# Patient Record
Sex: FEMALE | Race: WHITE | NOT HISPANIC OR LATINO | Employment: FULL TIME | URBAN - METROPOLITAN AREA
[De-identification: names, ages, dates, MRNs, and addresses within clinical notes are randomized per-mention and may not be internally consistent; named-entity substitution may affect disease eponyms.]

---

## 2023-09-20 ENCOUNTER — OUTSIDE PLACE OF SERVICE (OUTPATIENT)
Dept: PEDIATRIC CARDIOLOGY | Facility: CLINIC | Age: 28
End: 2023-09-20
Payer: MEDICAID

## 2023-09-20 PROCEDURE — 76827 ECHO EXAM OF FETAL HEART: CPT | Mod: 26,,, | Performed by: PEDIATRICS

## 2023-09-20 PROCEDURE — 99204 PR OFFICE/OUTPT VISIT, NEW, LEVL IV, 45-59 MIN: ICD-10-PCS | Mod: 25,,, | Performed by: PEDIATRICS

## 2023-09-20 PROCEDURE — 76825 PR  SO2 FETAL HEART: ICD-10-PCS | Mod: 26,,, | Performed by: PEDIATRICS

## 2023-09-20 PROCEDURE — 99204 OFFICE O/P NEW MOD 45 MIN: CPT | Mod: 25,,, | Performed by: PEDIATRICS

## 2023-09-20 PROCEDURE — 76827 PR  SO2 FETAL HEART DOPPLER: ICD-10-PCS | Mod: 26,,, | Performed by: PEDIATRICS

## 2023-09-20 PROCEDURE — 76825 ECHO EXAM OF FETAL HEART: CPT | Mod: 26,,, | Performed by: PEDIATRICS

## 2023-09-20 NOTE — PROGRESS NOTES
[facility]    OB: Dr. Brianne Enriquez    MFM: Dr. Hannah NIELSEN  Today, I had the pleasure of evaluating Payton Claudio who is now 27 y.o. and carrying her first pregnancy at 21w6d gestation with an HALEY of 2024. She was referred for evaluation of the fetal heart due to a thickened nuchal fold noted on a recent ultrasound. Prior testing includes a low-risk free fetal DNA screening.    She is carrying a female fetus, yet unnamed.      Obstetric History:    .  Her OB history is otherwise unremarkable.     Past Medical History:   Diagnosis Date    Hidradenitis suppurativa     Psoriasis          Current Outpatient Medications:     acetaminophen (TYLENOL) 325 MG tablet, Take 325 mg by mouth every 6 (six) hours as needed for Pain (Headaches)., Disp: , Rfl:     prenatal 25/iron fum/folic/dha (PRENATAL-1 ORAL), Take 1 tablet by mouth once daily., Disp: , Rfl:     Family History: Patient's father has a history of aortic valve abnormality requiring surgery at 50 years of age and subsequently  1 day later following surgery. The patient's paternal uncle also had the same valve abnormality as her father requiring surgery, currently living      FETAL ECHOCARDIOGRAM (summary):  S,D,S  Grossly structurally normal fetal heart with normally related great arteries.   There is a normal fetal foramen ovale with right to left flow.   Normal atrioventricular valve size with no significant atrioventricular valve insufficiency.   Good cardiac contractility.   Normal semilunar valve size and function.   The ductus arteriosus was visualized with right to left flow.   Normal branch pulmonary artery size.   The aortic arch was difficult to visualize.  It appears normal in size on limited evaluation.   Normal systemic venous drainage.   One pulmonary vein noted to drain to the left atrium.   No pericardial effusion.   Fetal heart rate 153 bpm      Impression:  Single active female fetus at 21w6d gestation.  Normal fetal echocardiogram.      Todays fetal echocardiogram is normal, within the limitations of fetal echocardiography.  I discussed with her that fetal echocardiography is insufficiently sensitive to rule out all septal defects, anomalies of pulmonary and systemic veins, arch anomalies, and some valvar abnormalities, nor can it ensure that the ductus arteriosus and foramen ovale will spontaneously close.     Recommendations:  Location, timing, and mode of delivery will be determined by the obstetrical team.  She does not require further follow-up in the fetal echocardiography clinic, but I would be happy to see her again if additional questions or concerns arise.    Should there be any concerns about the baby's heart after birth, a post- echocardiogram and cardiology consultation are recommended.     The above information was discussed in detail including the use of diagrams, with 45 minutes of total face to face time, with greater than 50% with counseling and coordination of care.  The discussion of the diagnosis and treatment options is as described above.      Rebeca Adhikari MD  Pediatric Cardiology  [address] [facility]  [place] [address]  Office: [phone]

## 2024-12-03 ENCOUNTER — HOSPITAL ENCOUNTER (EMERGENCY)
Facility: HOSPITAL | Age: 29
Discharge: HOME | End: 2024-12-03
Attending: EMERGENCY MEDICINE
Payer: COMMERCIAL

## 2024-12-03 VITALS
SYSTOLIC BLOOD PRESSURE: 153 MMHG | WEIGHT: 279.75 LBS | HEIGHT: 69 IN | HEART RATE: 93 BPM | BODY MASS INDEX: 41.43 KG/M2 | RESPIRATION RATE: 20 BRPM | TEMPERATURE: 98 F | OXYGEN SATURATION: 98 % | DIASTOLIC BLOOD PRESSURE: 98 MMHG

## 2024-12-03 DIAGNOSIS — N83.201 CYST OF RIGHT OVARY: Primary | ICD-10-CM

## 2024-12-03 LAB
ALBUMIN SERPL BCP-MCNC: 3.8 G/DL (ref 3.5–5.2)
ALP SERPL-CCNC: 102 U/L (ref 40–150)
ALT SERPL W/O P-5'-P-CCNC: 55 U/L (ref 10–44)
ANION GAP SERPL CALC-SCNC: 7 MMOL/L (ref 8–16)
AST SERPL-CCNC: 41 U/L (ref 10–40)
B-HCG UR QL: NEGATIVE
BASOPHILS # BLD AUTO: 0.05 K/UL (ref 0–0.2)
BASOPHILS NFR BLD: 0.8 % (ref 0–1.9)
BILIRUB SERPL-MCNC: 0.4 MG/DL (ref 0.1–1)
BILIRUB UR QL STRIP: NEGATIVE
BUN SERPL-MCNC: 14 MG/DL (ref 6–20)
CALCIUM SERPL-MCNC: 9 MG/DL (ref 8.7–10.5)
CHLORIDE SERPL-SCNC: 104 MMOL/L (ref 95–110)
CLARITY UR: CLEAR
CO2 SERPL-SCNC: 26 MMOL/L (ref 23–29)
COLOR UR: YELLOW
CREAT SERPL-MCNC: 0.8 MG/DL (ref 0.5–1.4)
DIFFERENTIAL METHOD BLD: ABNORMAL
EOSINOPHIL # BLD AUTO: 0.1 K/UL (ref 0–0.5)
EOSINOPHIL NFR BLD: 2 % (ref 0–8)
ERYTHROCYTE [DISTWIDTH] IN BLOOD BY AUTOMATED COUNT: 12.4 % (ref 11.5–14.5)
EST. GFR  (NO RACE VARIABLE): >60 ML/MIN/1.73 M^2
GLUCOSE SERPL-MCNC: 101 MG/DL (ref 70–110)
GLUCOSE UR QL STRIP: NEGATIVE
HCT VFR BLD AUTO: 40.5 % (ref 37–48.5)
HCV AB SERPL QL IA: NEGATIVE
HEP C VIRUS HOLD SPECIMEN: NORMAL
HGB BLD-MCNC: 13.9 G/DL (ref 12–16)
HGB UR QL STRIP: NEGATIVE
HIV 1+2 AB+HIV1 P24 AG SERPL QL IA: NEGATIVE
IMM GRANULOCYTES # BLD AUTO: 0.05 K/UL (ref 0–0.04)
IMM GRANULOCYTES NFR BLD AUTO: 0.8 % (ref 0–0.5)
KETONES UR QL STRIP: NEGATIVE
LEUKOCYTE ESTERASE UR QL STRIP: ABNORMAL
LIPASE SERPL-CCNC: 14 U/L (ref 4–60)
LYMPHOCYTES # BLD AUTO: 1.6 K/UL (ref 1–4.8)
LYMPHOCYTES NFR BLD: 25.8 % (ref 18–48)
MCH RBC QN AUTO: 29 PG (ref 27–31)
MCHC RBC AUTO-ENTMCNC: 34.3 G/DL (ref 32–36)
MCV RBC AUTO: 85 FL (ref 82–98)
MICROSCOPIC COMMENT: ABNORMAL
MONOCYTES # BLD AUTO: 0.3 K/UL (ref 0.3–1)
MONOCYTES NFR BLD: 4.6 % (ref 4–15)
NEUTROPHILS # BLD AUTO: 4.2 K/UL (ref 1.8–7.7)
NEUTROPHILS NFR BLD: 66 % (ref 38–73)
NITRITE UR QL STRIP: NEGATIVE
NRBC BLD-RTO: 0 /100 WBC
PH UR STRIP: 6 [PH] (ref 5–8)
PLATELET # BLD AUTO: 232 K/UL (ref 150–450)
PMV BLD AUTO: 10.3 FL (ref 9.2–12.9)
POTASSIUM SERPL-SCNC: 3.8 MMOL/L (ref 3.5–5.1)
PROT SERPL-MCNC: 7.7 G/DL (ref 6–8.4)
PROT UR QL STRIP: ABNORMAL
RBC # BLD AUTO: 4.79 M/UL (ref 4–5.4)
RBC #/AREA URNS HPF: 4 /HPF (ref 0–4)
SODIUM SERPL-SCNC: 137 MMOL/L (ref 136–145)
SP GR UR STRIP: 1.02 (ref 1–1.03)
SQUAMOUS #/AREA URNS HPF: 4 /HPF
UNIDENT CRYS URNS QL MICRO: ABNORMAL
URN SPEC COLLECT METH UR: ABNORMAL
UROBILINOGEN UR STRIP-ACNC: NEGATIVE EU/DL
WBC # BLD AUTO: 6.35 K/UL (ref 3.9–12.7)
WBC #/AREA URNS HPF: 26 /HPF (ref 0–5)

## 2024-12-03 PROCEDURE — 86803 HEPATITIS C AB TEST: CPT | Performed by: EMERGENCY MEDICINE

## 2024-12-03 PROCEDURE — 83690 ASSAY OF LIPASE: CPT | Performed by: EMERGENCY MEDICINE

## 2024-12-03 PROCEDURE — 99284 EMERGENCY DEPT VISIT MOD MDM: CPT | Mod: 25

## 2024-12-03 PROCEDURE — 87389 HIV-1 AG W/HIV-1&-2 AB AG IA: CPT | Performed by: EMERGENCY MEDICINE

## 2024-12-03 PROCEDURE — 87086 URINE CULTURE/COLONY COUNT: CPT | Performed by: EMERGENCY MEDICINE

## 2024-12-03 PROCEDURE — 81025 URINE PREGNANCY TEST: CPT | Performed by: EMERGENCY MEDICINE

## 2024-12-03 PROCEDURE — 81000 URINALYSIS NONAUTO W/SCOPE: CPT | Performed by: EMERGENCY MEDICINE

## 2024-12-03 PROCEDURE — 87147 CULTURE TYPE IMMUNOLOGIC: CPT | Performed by: EMERGENCY MEDICINE

## 2024-12-03 PROCEDURE — 85025 COMPLETE CBC W/AUTO DIFF WBC: CPT | Performed by: EMERGENCY MEDICINE

## 2024-12-03 PROCEDURE — 87088 URINE BACTERIA CULTURE: CPT | Performed by: EMERGENCY MEDICINE

## 2024-12-03 PROCEDURE — 80053 COMPREHEN METABOLIC PANEL: CPT | Performed by: EMERGENCY MEDICINE

## 2024-12-03 RX ORDER — LISINOPRIL 10 MG/1
10 TABLET ORAL DAILY
COMMUNITY

## 2024-12-03 RX ORDER — NAPROXEN 375 MG/1
375 TABLET ORAL 2 TIMES DAILY WITH MEALS
Qty: 30 TABLET | Refills: 0 | Status: SHIPPED | OUTPATIENT
Start: 2024-12-03

## 2024-12-03 NOTE — ED PROVIDER NOTES
SCRIBE #1 NOTE: I, Bharat Waddell, am scribing for, and in the presence of, Quang Rutldege MD. I have scribed the entire note.       History     Chief Complaint   Patient presents with    Abdominal Pain     Pt reports yesterday morning began having bilateral lower quadrant pain and diarrhea. This morning pt reports right lower quadrant pain, especially tender upon palpation. -N/-V/-D today     Review of patient's allergies indicates:   Allergen Reactions    Sulfa (sulfonamide antibiotics) Hives         History of Present Illness     HPI    12/3/2024, 6:12 AM  History obtained from the patient      History of Present Illness: Payton Claudio is a 29 y.o. female patient with a PMHx of hidradenitis suppurativa and psoriasis who presents to the Emergency Department for evaluation of bilateral lower quadrant pain which onset gradually since yesterday morning. Pt states the pain became just the RLQ today under palpation. Symptoms are constant and moderate in severity. No mitigating or exacerbating factors reported. No associated sxs reported. Patient denies any vaginal discharge, fever, nausea, vomiting, diarrhea, and all other sxs at this time. No prior Tx reported. Pt states her last menstrual cycle ended on November 19th. No further complaints or concerns at this time.       Arrival mode: Personal vehicle     PCP: No, Primary Doctor        Past Medical History:  Past Medical History:   Diagnosis Date    Hidradenitis suppurativa     Psoriasis        Past Surgical History:  Past Surgical History:   Procedure Laterality Date    TONSILLECTOMY Bilateral     WISDOM TOOTH EXTRACTION Bilateral          Family History:  No family history on file.    Social History:  Social History     Tobacco Use    Smoking status: Never    Smokeless tobacco: Never   Substance and Sexual Activity    Alcohol use: Not Currently    Drug use: Never    Sexual activity: Yes     Partners: Male        Review of Systems     Review of Systems    Constitutional:  Negative for chills and fever.   HENT:  Negative for sore throat.    Respiratory:  Negative for shortness of breath.    Cardiovascular:  Negative for chest pain.   Gastrointestinal:  Positive for abdominal pain. Negative for diarrhea, nausea and vomiting.   Genitourinary:  Negative for dysuria and vaginal discharge.   Musculoskeletal:  Negative for back pain.   Skin:  Negative for rash.   Neurological:  Negative for weakness.   Hematological:  Does not bruise/bleed easily.   All other systems reviewed and are negative.     Physical Exam     Initial Vitals [12/03/24 0600]   BP Pulse Resp Temp SpO2   (!) 164/111 110 18 98 °F (36.7 °C) 99 %      MAP       --          Physical Exam  Nursing Notes and Vital Signs Reviewed.  Constitutional: Patient is in no acute distress. Well-developed and well-nourished.  Head: Atraumatic. Normocephalic.  Eyes: PERRL. EOM intact. Conjunctivae are not pale. No scleral icterus.  ENT: Mucous membranes are moist. Oropharynx is clear and symmetric.    Neck: Supple. Full ROM. No lymphadenopathy.  Cardiovascular: Regular rate. Regular rhythm. No murmurs, rubs, or gallops. Distal pulses are 2+ and symmetric.  Pulmonary/Chest: No respiratory distress. Clear to auscultation bilaterally. No wheezing or rales.  Abdominal: Soft and non-distended.  There is no tenderness.  No rebound, guarding, or rigidity. Good bowel sounds.  Genitourinary: No CVA tenderness  Musculoskeletal: Moves all extremities. No obvious deformities. No edema. No calf tenderness.  Skin: Warm and dry.  Neurological:  Alert, awake, and appropriate.  Normal speech.  No acute focal neurological deficits are appreciated.  Psychiatric: Normal affect. Good eye contact. Appropriate in content.     ED Course   Procedures  ED Vital Signs:  Vitals:    12/03/24 0600 12/03/24 0630 12/03/24 0730 12/03/24 0830   BP: (!) 164/111 (!) 177/114 (!) 156/87 (!) 149/97   Pulse: 110 98 93 88   Resp: 18 18 20 20   Temp: 98 °F (36.7  "°C)      TempSrc: Oral      SpO2: 99% 99% 98% 99%   Weight: 126.9 kg (279 lb 12.2 oz)      Height: 5' 9" (1.753 m)       12/03/24 0900   BP: (!) 156/113   Pulse: 89   Resp: 20   Temp:    TempSrc:    SpO2: 100%   Weight:    Height:        Abnormal Lab Results:  Labs Reviewed   URINALYSIS, REFLEX TO URINE CULTURE - Abnormal       Result Value    Specimen UA Urine, Clean Catch      Color, UA Yellow      Appearance, UA Clear      pH, UA 6.0      Specific Gravity, UA 1.020      Protein, UA Trace (*)     Glucose, UA Negative      Ketones, UA Negative      Bilirubin (UA) Negative      Occult Blood UA Negative      Nitrite, UA Negative      Urobilinogen, UA Negative      Leukocytes, UA 3+ (*)     Narrative:     Specimen Source->Urine   CBC W/ AUTO DIFFERENTIAL - Abnormal    WBC 6.35      RBC 4.79      Hemoglobin 13.9      Hematocrit 40.5      MCV 85      MCH 29.0      MCHC 34.3      RDW 12.4      Platelets 232      MPV 10.3      Immature Granulocytes 0.8 (*)     Gran # (ANC) 4.2      Immature Grans (Abs) 0.05 (*)     Lymph # 1.6      Mono # 0.3      Eos # 0.1      Baso # 0.05      nRBC 0      Gran % 66.0      Lymph % 25.8      Mono % 4.6      Eosinophil % 2.0      Basophil % 0.8      Differential Method Automated      Narrative:     Release to patient->Immediate   COMPREHENSIVE METABOLIC PANEL - Abnormal    Sodium 137      Potassium 3.8      Chloride 104      CO2 26      Glucose 101      BUN 14      Creatinine 0.8      Calcium 9.0      Total Protein 7.7      Albumin 3.8      Total Bilirubin 0.4      Alkaline Phosphatase 102      AST 41 (*)     ALT 55 (*)     eGFR >60      Anion Gap 7 (*)     Narrative:     Release to patient->Immediate   URINALYSIS MICROSCOPIC - Abnormal    RBC, UA 4      WBC, UA 26 (*)     Squam Epithel, UA 4      Unclass Jenna UA Rare      Microscopic Comment SEE COMMENT      Narrative:     Specimen Source->Urine   CULTURE, URINE   HEPATITIS C ANTIBODY    Hepatitis C Ab Negative      Narrative:     Release " to patient->Immediate   HEP C VIRUS HOLD SPECIMEN    HEP C Virus Hold Specimen Hold for HCV sendout      Narrative:     Release to patient->Immediate   HIV 1 / 2 ANTIBODY    HIV 1/2 Ag/Ab Negative      Narrative:     Release to patient->Immediate   PREGNANCY TEST, URINE RAPID    Preg Test, Ur Negative     LIPASE    Lipase 14      Narrative:     Release to patient->Immediate        All Lab Results:  Results for orders placed or performed during the hospital encounter of 12/03/24   Urinalysis, Reflex to Urine Culture Urine, Clean Catch    Collection Time: 12/03/24  6:13 AM    Specimen: Urine   Result Value Ref Range    Specimen UA Urine, Clean Catch     Color, UA Yellow Yellow, Straw, Radha    Appearance, UA Clear Clear    pH, UA 6.0 5.0 - 8.0    Specific Gravity, UA 1.020 1.005 - 1.030    Protein, UA Trace (A) Negative    Glucose, UA Negative Negative    Ketones, UA Negative Negative    Bilirubin (UA) Negative Negative    Occult Blood UA Negative Negative    Nitrite, UA Negative Negative    Urobilinogen, UA Negative <2.0 EU/dL    Leukocytes, UA 3+ (A) Negative   Urinalysis Microscopic    Collection Time: 12/03/24  6:13 AM   Result Value Ref Range    RBC, UA 4 0 - 4 /hpf    WBC, UA 26 (H) 0 - 5 /hpf    Squam Epithel, UA 4 /hpf    Unclass Jenna UA Rare None-Moderate    Microscopic Comment SEE COMMENT    Hepatitis C Antibody    Collection Time: 12/03/24  6:22 AM   Result Value Ref Range    Hepatitis C Ab Negative Negative   HCV Virus Hold Specimen    Collection Time: 12/03/24  6:22 AM   Result Value Ref Range    HEP C Virus Hold Specimen Hold for HCV sendout    HIV 1/2 Ag/Ab (4th Gen)    Collection Time: 12/03/24  6:22 AM   Result Value Ref Range    HIV 1/2 Ag/Ab Negative Negative   CBC Auto Differential    Collection Time: 12/03/24  6:22 AM   Result Value Ref Range    WBC 6.35 3.90 - 12.70 K/uL    RBC 4.79 4.00 - 5.40 M/uL    Hemoglobin 13.9 12.0 - 16.0 g/dL    Hematocrit 40.5 37.0 - 48.5 %    MCV 85 82 - 98 fL    MCH  29.0 27.0 - 31.0 pg    MCHC 34.3 32.0 - 36.0 g/dL    RDW 12.4 11.5 - 14.5 %    Platelets 232 150 - 450 K/uL    MPV 10.3 9.2 - 12.9 fL    Immature Granulocytes 0.8 (H) 0.0 - 0.5 %    Gran # (ANC) 4.2 1.8 - 7.7 K/uL    Immature Grans (Abs) 0.05 (H) 0.00 - 0.04 K/uL    Lymph # 1.6 1.0 - 4.8 K/uL    Mono # 0.3 0.3 - 1.0 K/uL    Eos # 0.1 0.0 - 0.5 K/uL    Baso # 0.05 0.00 - 0.20 K/uL    nRBC 0 0 /100 WBC    Gran % 66.0 38.0 - 73.0 %    Lymph % 25.8 18.0 - 48.0 %    Mono % 4.6 4.0 - 15.0 %    Eosinophil % 2.0 0.0 - 8.0 %    Basophil % 0.8 0.0 - 1.9 %    Differential Method Automated    Comprehensive Metabolic Panel    Collection Time: 12/03/24  6:22 AM   Result Value Ref Range    Sodium 137 136 - 145 mmol/L    Potassium 3.8 3.5 - 5.1 mmol/L    Chloride 104 95 - 110 mmol/L    CO2 26 23 - 29 mmol/L    Glucose 101 70 - 110 mg/dL    BUN 14 6 - 20 mg/dL    Creatinine 0.8 0.5 - 1.4 mg/dL    Calcium 9.0 8.7 - 10.5 mg/dL    Total Protein 7.7 6.0 - 8.4 g/dL    Albumin 3.8 3.5 - 5.2 g/dL    Total Bilirubin 0.4 0.1 - 1.0 mg/dL    Alkaline Phosphatase 102 40 - 150 U/L    AST 41 (H) 10 - 40 U/L    ALT 55 (H) 10 - 44 U/L    eGFR >60 >60 mL/min/1.73 m^2    Anion Gap 7 (L) 8 - 16 mmol/L   Lipase    Collection Time: 12/03/24  6:22 AM   Result Value Ref Range    Lipase 14 4 - 60 U/L   Pregnancy, urine rapid    Collection Time: 12/03/24  6:24 AM   Result Value Ref Range    Preg Test, Ur Negative          Imaging Results:  Imaging Results              US Pelvis Comp with Transvag NON-OB (xpd) (Final result)  Result time 12/03/24 10:06:38   Procedure changed from US Pelvis Complete Non OB     Final result by Brenton Huertas MD (12/03/24 10:06:38)                   Impression:      Right ovarian hemorrhagic cyst suspected.    Details as above.      Electronically signed by: Brenton Huertas  Date:    12/03/2024  Time:    10:06               Narrative:    EXAMINATION:  US PELVIS COMP WITH TRANSVAG NON-OB (XPD)    CLINICAL HISTORY:  ovarian  cyst;    TECHNIQUE:  Transabdominal sonography of the pelvis was performed, followed by transvaginal sonography to better evaluate the uterus and ovaries.    COMPARISON:  None.    FINDINGS:  Uterus:    Size: 6.6 x 3.9 x 4.7 cm    Masses: None    Endometrium: Normal in this pre menopausal patient, measuring 6 mm.    Right ovary:    Size: 5.2 x 3.6 x 3.5 cm    Appearance: 3.4 cm suspected hemorrhagic cyst.  Additional smaller lesions suspected.  Recommend 12 week follow-up to ensure resolution.    Vascular flow: Normal.    Left ovary:    Size: 2.9 x 1.5 x 2.2 cm    Appearance: Normal    Vascular Flow: Normal.    Free Fluid:    None.                                       CT Renal Stone Study ABD Pelvis WO (Final result)  Result time 12/03/24 07:48:00      Final result by Venkat Sanchez MD (12/03/24 07:48:00)                   Impression:      1. No acute finding in the abdomen or pelvis.  2. Other findings as above.      Electronically signed by: Venkat Sanchez  Date:    12/03/2024  Time:    07:48               Narrative:    EXAMINATION:  CT RENAL STONE STUDY ABD PELVIS WO    CLINICAL HISTORY:  Flank pain, kidney stone suspected;    COMPARISON:  None available.    TECHNIQUE:  Axial CT images were obtained of the abdomen and pelvis.  Iterative reconstruction technique was used. The CT exam was performed using one or more of the following dose reduction techniques- Automated exposure control, adjustment of the mA and/or kV according to patient size, and/or use of iterative reconstructed technique.    FINDINGS:  Heart: Normal in size. No pericardial effusion.    Lung Bases: Mild bibasilar atelectasis/scarring.    Liver: Hepatic steatosis.    Gallbladder: No calcified gallstones.    Bile Ducts: No evidence of dilated ducts.    Pancreas: No mass or peripancreatic fat stranding.    Spleen: Unremarkable.    Adrenals: Unremarkable.    Kidneys/ Ureters: No renal calculi or hydronephrosis.    Bladder: No evidence of wall  thickening.    Reproductive organs: Simple appearing 4.9 cm right adnexal cyst.    GI Tract/Mesentery: No evidence of bowel obstruction or acute inflammation.  The appendix has a normal appearance.    Peritoneal Space: Unremarkable.    Retroperitoneum: No adenopathy.    Abdominal wall: Unremarkable.    Vasculature: No aortic aneurysm.    Bones: No acute fracture.                                                The Emergency Provider reviewed the vital signs and test results, which are outlined above.     ED Discussion     10:35 AM: Reassessed pt at this time. Discussed with pt all pertinent ED information and results. Discussed pt dx and plan of tx. Gave pt all f/u and return to the ED instructions. All questions and concerns were addressed at this time. Pt expresses understanding of information and instructions, and is comfortable with plan to discharge. Pt is stable for discharge.    I discussed with patient and/or family/caretaker that evaluation in the ED does not suggest any emergent or life threatening medical conditions requiring immediate intervention beyond what was provided in the ED, and I believe patient is safe for discharge.  Regardless, an unremarkable evaluation in the ED does not preclude the development or presence of a serious of life threatening condition. As such, patient was instructed to return immediately for any worsening or change in current symptoms.         Medical Decision Making  DDx: appendicitis, ovarian cyst    Amount and/or Complexity of Data Reviewed  Labs: ordered. Decision-making details documented in ED Course.  Radiology: ordered. Decision-making details documented in ED Course.     Details: Ovarian cyst, no torsion    Risk  Prescription drug management.                ED Medication(s):  Medications - No data to display    New Prescriptions    NAPROXEN (NAPROSYN) 375 MG TABLET    Take 1 tablet (375 mg total) by mouth 2 (two) times daily with meals.        Follow-up Information        [facility] In 2 days.    Contact information:  [address] [place] [address]  [phone]                                 Scribe Attestation:   Scribe #1: I performed the above scribed service and the documentation accurately describes the services I performed. I attest to the accuracy of the note.     Attending:   Physician Attestation Statement for Scribe #1: I, Quang Rutledge MD, personally performed the services described in this documentation, as scribed by Bharat Waddell, in my presence, and it is both accurate and complete.           Clinical Impression       ICD-10-CM ICD-9-CM   1. Cyst of right ovary  N83.201 620.2       Disposition:   Disposition: Discharged  Condition: Stable         Quang Rutledge MD  12/03/24 1108

## 2024-12-03 NOTE — Clinical Note
"Payton Donovana" González was seen and treated in our emergency department on 12/3/2024.  She may return to work on 12/04/2024.       If you have any questions or concerns, please don't hesitate to call.      RN RN    "

## 2024-12-05 LAB
BACTERIA UR CULT: ABNORMAL

## 2024-12-07 ENCOUNTER — TELEPHONE (OUTPATIENT)
Dept: EMERGENCY MEDICINE | Facility: HOSPITAL | Age: 29
End: 2024-12-07
Payer: COMMERCIAL

## 2025-02-07 ENCOUNTER — OFFICE VISIT (OUTPATIENT)
Dept: OBSTETRICS AND GYNECOLOGY | Facility: CLINIC | Age: 30
End: 2025-02-07
Payer: COMMERCIAL

## 2025-02-07 VITALS
HEIGHT: 69 IN | SYSTOLIC BLOOD PRESSURE: 146 MMHG | DIASTOLIC BLOOD PRESSURE: 84 MMHG | BODY MASS INDEX: 42.29 KG/M2 | WEIGHT: 285.5 LBS

## 2025-02-07 DIAGNOSIS — N83.201 HEMORRHAGIC CYST OF RIGHT OVARY: Primary | ICD-10-CM

## 2025-02-07 PROCEDURE — 99999 PR PBB SHADOW E&M-EST. PATIENT-LVL IV: CPT | Mod: PBBFAC,,,

## 2025-02-07 PROCEDURE — 4010F ACE/ARB THERAPY RXD/TAKEN: CPT | Mod: CPTII,S$GLB,,

## 2025-02-07 PROCEDURE — 3077F SYST BP >= 140 MM HG: CPT | Mod: CPTII,S$GLB,,

## 2025-02-07 PROCEDURE — 1160F RVW MEDS BY RX/DR IN RCRD: CPT | Mod: CPTII,S$GLB,,

## 2025-02-07 PROCEDURE — 3008F BODY MASS INDEX DOCD: CPT | Mod: CPTII,S$GLB,,

## 2025-02-07 PROCEDURE — 3079F DIAST BP 80-89 MM HG: CPT | Mod: CPTII,S$GLB,,

## 2025-02-07 PROCEDURE — 99202 OFFICE O/P NEW SF 15 MIN: CPT | Mod: S$GLB,,,

## 2025-02-07 PROCEDURE — 1159F MED LIST DOCD IN RCRD: CPT | Mod: CPTII,S$GLB,,

## 2025-02-07 RX ORDER — CLOBETASOL PROPIONATE 0.5 MG/ML
SOLUTION TOPICAL 2 TIMES DAILY
COMMUNITY
Start: 2024-11-25

## 2025-02-07 RX ORDER — ADALIMUMAB 40MG/0.4ML
40 KIT SUBCUTANEOUS
COMMUNITY
Start: 2025-01-23

## 2025-02-07 RX ORDER — LOSARTAN POTASSIUM 25 MG/1
25 TABLET ORAL EVERY MORNING
COMMUNITY
Start: 2025-01-07

## 2025-02-07 RX ORDER — VALACYCLOVIR HYDROCHLORIDE 1 G/1
2000 TABLET, FILM COATED ORAL
COMMUNITY
Start: 2024-09-23

## 2025-02-07 NOTE — PROGRESS NOTES
Subjective:       Patient ID: Payton Claudio is a 29 y.o. female.    Chief Complaint:  Follow-up (Cyst to R ovary)      History of Present Illness  Follow-up  Associated symptoms include headaches. Pertinent negatives include no abdominal pain, anorexia, chills, fever, nausea, rash or vomiting.   Gynecologic Exam  The patient's pertinent negatives include no genital itching, genital lesions, genital odor, genital rash, missed menses, pelvic pain, vaginal bleeding or vaginal discharge. This is a recurrent problem. The current episode started more than 1 month ago. The problem occurs intermittently. The problem has been unchanged. The pain is mild. The problem affects the right side. She is not pregnant. Associated symptoms include headaches and painful intercourse. Pertinent negatives include no abdominal pain, anorexia, back pain, chills, constipation, diarrhea, discolored urine, dysuria, fever, flank pain, frequency, hematuria, nausea, rash, urgency or vomiting. The vaginal discharge was normal. The vaginal bleeding is typical of menses. She has been passing clots. She has not been passing tissue. The symptoms are aggravated by bowel movements, heavy lifting and intercourse. She has tried acetaminophen, heating pad, NSAIDs and warm bath for the symptoms. The treatment provided mild relief. She is sexually active. No, her partner does not have an STD. She uses nothing for contraception. Her menstrual history has been irregular. Her past medical history is significant for a  section. There is no history of an abdominal surgery, an ectopic pregnancy, endometriosis, a gynecological surgery, herpes simplex, menorrhagia, metrorrhagia, miscarriage, ovarian cysts, perineal abscess, PID, an STD, a terminated pregnancy or vaginosis.       Patient presents for follow up of right ovarian cyst   Patient presented to ER in early December with complaints of severe right lower quadrant pain and pelvic US was  performed, showing a suspected 3.4 hemorrhagic cyst to the right ovary.   She instructed to use tylenol or ibuprofen as need and symptoms resolved    She does not report a similar episode of pain since.   Menses are regular with periods lasting 3 days.  Denies excessive bleeding or cramping.    Previous OBGYN care at [facility]   Pap current     GYN & OB History  Patient's last menstrual period was 2025 (exact date).   Date of Last Pap: 2023    OB History    Para Term  AB Living   1 1 0 1 0 0   SAB IAB Ectopic Multiple Live Births   0 0 0 0 1      # Outcome Date GA Lbr Bib/2nd Weight Sex Type Anes PTL Lv   1       CS-LTranv   ND      Complications: Preeclampsia, severe       Review of Systems  Review of Systems   Constitutional:  Negative for chills and fever.   Gastrointestinal:  Negative for abdominal pain, anorexia, constipation, diarrhea, nausea and vomiting.   Genitourinary:  Negative for dysuria, flank pain, frequency, hematuria, menorrhagia, missed menses, pelvic pain, urgency and vaginal discharge.   Musculoskeletal:  Negative for back pain.   Integumentary:  Negative for rash.   Neurological:  Positive for headaches.           Objective:      Physical Exam:   Constitutional: She is oriented to person, place, and time. She appears well-developed and well-nourished.    HENT:   Head: Normocephalic and atraumatic.    Eyes: Pupils are equal, round, and reactive to light. Conjunctivae and EOM are normal.     Cardiovascular:  Normal rate, regular rhythm and normal heart sounds.             Pulmonary/Chest: Effort normal.        Abdominal: Soft. There is no abdominal tenderness.     Genitourinary:    Genitourinary Comments: deferred             Musculoskeletal: Normal range of motion and moves all extremeties.       Neurological: She is alert and oriented to person, place, and time.    Skin: Skin is warm and dry. She is not diaphoretic.    Psychiatric: She has a normal mood and  affect. Her behavior is normal. Judgment and thought content normal.        FINDINGS:  Uterus:  Size: 6.6 x 3.9 x 4.7 cm  Masses: None  Endometrium: Normal in this pre menopausal patient, measuring 6 mm.  Right ovary:  Size: 5.2 x 3.6 x 3.5 cm  Appearance: 3.4 cm suspected hemorrhagic cyst.  Additional smaller lesions suspected.  Recommend 12 week follow-up to ensure resolution.  Vascular flow: Normal.  Left ovary:  Size: 2.9 x 1.5 x 2.2 cm  Appearance: Normal  Vascular Flow: Normal.  Free Fluid:  None.  Impression:  Right ovarian hemorrhagic cyst suspected.     Details as above.     Assessment:        1. Hemorrhagic cyst of right ovary               Plan:   Pt was counseled on ovarian cysts.  Including that they are a common and often times asymptomatic benign findings that normally resolve spontaneously and without intervention.  Common symptoms associated with cysts and treatment/prevention options were discussed.  right ovarian cyst noted on CT/Ultrasound 12/3/24 is 3.4 cm. small, and is likely to resolve spontaneously. Will schedule repeat US to check for resolution of the cyst.   If pt remains asymptomatic, would advise no further interventions.  Treatment options to prevent future cyst were covered, including option for hormonal contraception.  Pt voiced understanding and desires repeat US for now. Pt will monitor symptoms in mean time.      Diagnosis and orders this visit:  Hemorrhagic cyst of right ovary  -     US Pelvis Comp with Transvag NON-OB (xpd; Future; Expected date: 02/07/2025         Evette Hdz NP

## 2025-03-07 ENCOUNTER — HOSPITAL ENCOUNTER (OUTPATIENT)
Dept: RADIOLOGY | Facility: HOSPITAL | Age: 30
Discharge: HOME | End: 2025-03-07
Payer: COMMERCIAL

## 2025-03-07 DIAGNOSIS — N83.201 HEMORRHAGIC CYST OF RIGHT OVARY: ICD-10-CM

## 2025-03-07 PROCEDURE — 76856 US EXAM PELVIC COMPLETE: CPT | Mod: TC

## 2025-03-07 PROCEDURE — 76856 US EXAM PELVIC COMPLETE: CPT | Mod: 26,,, | Performed by: RADIOLOGY

## 2025-03-07 PROCEDURE — 76830 TRANSVAGINAL US NON-OB: CPT | Mod: 26,,, | Performed by: RADIOLOGY

## 2025-03-25 ENCOUNTER — RESULTS FOLLOW-UP (OUTPATIENT)
Dept: OBSTETRICS AND GYNECOLOGY | Facility: CLINIC | Age: 30
End: 2025-03-25

## 2025-03-25 DIAGNOSIS — N83.201 HEMORRHAGIC CYST OF RIGHT OVARY: Primary | ICD-10-CM
